# Patient Record
Sex: FEMALE | Race: ASIAN | NOT HISPANIC OR LATINO | URBAN - METROPOLITAN AREA
[De-identification: names, ages, dates, MRNs, and addresses within clinical notes are randomized per-mention and may not be internally consistent; named-entity substitution may affect disease eponyms.]

---

## 2019-05-13 ENCOUNTER — EMERGENCY (EMERGENCY)
Facility: HOSPITAL | Age: 44
LOS: 1 days | Discharge: HOME | End: 2019-05-13
Admitting: EMERGENCY MEDICINE
Payer: SELF-PAY

## 2019-05-13 VITALS
SYSTOLIC BLOOD PRESSURE: 179 MMHG | TEMPERATURE: 98 F | RESPIRATION RATE: 19 BRPM | OXYGEN SATURATION: 100 % | DIASTOLIC BLOOD PRESSURE: 103 MMHG | HEART RATE: 61 BPM

## 2019-05-13 DIAGNOSIS — H57.11 OCULAR PAIN, RIGHT EYE: ICD-10-CM

## 2019-05-13 DIAGNOSIS — H20.9 UNSPECIFIED IRIDOCYCLITIS: ICD-10-CM

## 2019-05-13 PROCEDURE — 99283 EMERGENCY DEPT VISIT LOW MDM: CPT | Mod: 25

## 2019-05-13 PROCEDURE — 99053 MED SERV 10PM-8AM 24 HR FAC: CPT

## 2019-05-13 RX ORDER — IBUPROFEN 200 MG
600 TABLET ORAL ONCE
Refills: 0 | Status: DISCONTINUED | OUTPATIENT
Start: 2019-05-13 | End: 2019-05-17

## 2019-05-13 RX ORDER — MOXIFLOXACIN HCL 0.5 %
1 DROPS OPHTHALMIC (EYE) ONCE
Refills: 0 | Status: DISCONTINUED | OUTPATIENT
Start: 2019-05-13 | End: 2019-05-17

## 2019-05-13 NOTE — ED PROVIDER NOTE - CLINICAL SUMMARY MEDICAL DECISION MAKING FREE TEXT BOX
42 y/o F presents to ED c/o R eye pain x approx 12 hours.  Pt wears contacts.  Pt well appearing but uncomfortable.  Exam consistent with iritis.  Will treat with Vigamox.  Pt urged to f/u with [facility] this am at 9 am without fail. She agrees to go.  Contact information provided.

## 2019-05-13 NOTE — ED PROVIDER NOTE - CONSTITUTIONAL, MLM
normal... Well appearing, well nourished, awake, alert, oriented to person, place, time/situation.  Pt appears uncomfortable.

## 2019-05-13 NOTE — ED PROVIDER NOTE - OBJECTIVE STATEMENT
44 y/o F, no significant PMH presents to ED c/o R eye pain, redness, tearing and light sensitivity since yesterday afternoon.  She wears contact for vision correction and reports poor vision at baseline.  She arrived from [place] yesterday and walked around the city when she started to feel a foreign body sensation and irritation of the R eye.  She removed a small piece of lint from the eye but her pain worsened. 44 y/o F, no significant PMH presents to ED c/o R eye pain, redness, tearing and light sensitivity since yesterday afternoon.  She wears contact for vision correction and reports poor vision at baseline.  She arrived from [place] yesterday and walked around the city when she started to feel a foreign body sensation and irritation of the R eye.  She removed a small piece of lint from the eye but her pain worsened.  She removed her contacts with no effect.  Pt denies colored drainage.

## 2019-05-13 NOTE — ED PROVIDER NOTE - NSFOLLOWUPINSTRUCTIONS_ED_ALL_ED_FT
Follow up with [facility] today.  Take Vigamox: One drop in the right eye 3 times per day for 7 days.

## 2019-05-13 NOTE — ED ADULT TRIAGE NOTE - CHIEF COMPLAINT QUOTE
c/o R eye pain since 10 pm after taking out contacts. pt reports she noticed a hair in her contact and has been having increasing pain since.

## 2020-11-18 NOTE — ED ADULT NURSE NOTE - NS ED NOTE ABUSE RESPONSE YN
Health Maintenance Due   Topic Date Due   • DTaP/Tdap/Td Vaccine (1 - Tdap) 08/06/1960   • Medicare Wellness Visit  11/11/2020   • Depression Screening  11/11/2020       Patient is due for topics as listed above but is not proceeding with Immunization(s) Dtap/Tdap/Td at this time. Orders placed for Depression Screening  and MWV (Medicare Wellness Visit). Education provided for Immunization(s) Dtap/Tdap/Td.    Recent PHQ 2/9 Score    PHQ 2:  Date Adult PHQ 2 Score Adult PHQ 2 Interpretation   11/18/2020 2 No further screening needed       PHQ 9:  Date Adult PHQ 9 Score Adult PHQ 9 Interpretation   11/18/2020 6 Mild Depression     5.) Do you do moderate to strenuous exercise (brisk walk) for about 20 minutes for 3 or more days per week?: No, I usually do not exercise this much     6 c.) How many servings of Fried or High Fat Foods do you have each day (1 serving = 1 Meza, French Fries, chips, doughnut, fried chicken/fish): 2 per day     7.) Have you had a fall two or more times in the past year?: Yes     8.) During the past 4 weeks, has your physical and emotional health limited your social activities with family, friends, neighbors, or other groups?: Quite a bit     11d.) Bodily pain: Always     11h.) Problems with your hearing: Always     11i.) Problems using the telephone: Always            Yes

## 2024-04-30 PROBLEM — Z78.9 OTHER SPECIFIED HEALTH STATUS: Chronic | Status: ACTIVE | Noted: 2019-05-13

## 2024-05-14 ENCOUNTER — APPOINTMENT (OUTPATIENT)
Dept: OPHTHALMOLOGY | Facility: CLINIC | Age: 49
End: 2024-05-14